# Patient Record
Sex: FEMALE | Race: WHITE | HISPANIC OR LATINO
[De-identification: names, ages, dates, MRNs, and addresses within clinical notes are randomized per-mention and may not be internally consistent; named-entity substitution may affect disease eponyms.]

---

## 2022-05-12 ENCOUNTER — APPOINTMENT (OUTPATIENT)
Dept: SURGERY | Facility: CLINIC | Age: 47
End: 2022-05-12
Payer: COMMERCIAL

## 2022-05-12 ENCOUNTER — NON-APPOINTMENT (OUTPATIENT)
Age: 47
End: 2022-05-12

## 2022-05-12 VITALS
TEMPERATURE: 97.5 F | WEIGHT: 148 LBS | OXYGEN SATURATION: 98 % | HEIGHT: 61 IN | SYSTOLIC BLOOD PRESSURE: 133 MMHG | HEART RATE: 78 BPM | DIASTOLIC BLOOD PRESSURE: 82 MMHG | BODY MASS INDEX: 27.94 KG/M2

## 2022-05-12 DIAGNOSIS — Z00.00 ENCOUNTER FOR GENERAL ADULT MEDICAL EXAMINATION W/OUT ABNORMAL FINDINGS: ICD-10-CM

## 2022-05-12 DIAGNOSIS — Z78.9 OTHER SPECIFIED HEALTH STATUS: ICD-10-CM

## 2022-05-12 DIAGNOSIS — Z72.0 TOBACCO USE: ICD-10-CM

## 2022-05-12 PROCEDURE — 99203 OFFICE O/P NEW LOW 30 MIN: CPT

## 2022-05-12 RX ORDER — OMEPRAZOLE 40 MG/1
40 CAPSULE, DELAYED RELEASE ORAL
Refills: 0 | Status: ACTIVE | COMMUNITY

## 2022-05-12 NOTE — ASSESSMENT
[FreeTextEntry1] : Ms Barboza understands that imaging demonstrates abnormal breast calcifications in upper inner left breast. We also discussed lobular carcinoma in situ and significance. She understands that lobular carcinoma in situ is a high risk marker which increases the risk of breast cancer in right and left breast.  We discussed option of surgical excisional biopsy v observation alone.  We also discussed technical aspects of localized excisional biopsy v CHRISTOPHER localized excisional biopsy.   We discussed risks v benefits v alternative to biopsy.    We also discussed high risk screening with mammogram/ultrasound v MRI as well as chemoprevention to decrease risk.  A copy of the pathology results were provided to her. All questions were answered.\par \par -MRI breast\par -CT C/A/P and bone scan\par - Plastics consultation\par - Radiation oncology consultation\par - Medical oncology consultation\par -  Follow up genetic testing\par -  Follow up in 2 weeks

## 2022-05-12 NOTE — HISTORY OF PRESENT ILLNESS
[FreeTextEntry1] : I had the pleasure of seeing Casandra Barboza in my office today for a new breast evaluation secondary to abnormal imaging. She under went core biopsy which has demonstrated lobular carcinoma in left breast.\par \par Imaging\par 3/1/22 mammo jim bilat- grouped calcifications upper inner left breast posteriorly,m possibly with associated nodule. Diagnostic left breast mammo recommended. \par 3/16/22 mammo jim unilat left- intermediate grouped calcifications upper inner left breast. biopsy recommended Bi-Rads 4 suspicious finding. \par 3/28/22 stereo breast biopsy with clip placement\par \par Pathology \par 4/8/22 A. Lobular carcinoma in situ. Fibrocystic change with epithelial calcifications. size 3.5x3x0.5cm\par B. left breast upper inner without calcification, core biopsy: fibrocystic change, size 1x1x0.3cm

## 2022-05-15 NOTE — PHYSICAL EXAM
[Normocephalic] : normocephalic [Atraumatic] : atraumatic [EOMI] : extra ocular movement intact [PERRL] : pupils equal, round and reactive to light [Sclera nonicteric] : sclera nonicteric [Supple] : supple [No Supraclavicular Adenopathy] : no supraclavicular adenopathy [Examined in the supine and seated position] : examined in the supine and seated position [No dominant masses] : no dominant masses in right breast  [No dominant masses] : no dominant masses left breast [No Nipple Retraction] : no left nipple retraction [No Nipple Discharge] : no left nipple discharge [No Axillary Lymphadenopathy] : no left axillary lymphadenopathy [No Edema] : no edema [No Rashes] : no rashes [No Ulceration] : no ulceration [Breast Nipple Inversion] : nipples not inverted [Breast Nipple Retraction] : nipples not retracted [Breast Nipple Flattening] : nipples not flattened [Breast Nipple Fissures] : nipples not fissured [Breast Abnormal Lactation (Galactorrhea)] : no galactorrhea [Breast Abnormal Secretion Bloody Fluid] : no bloody discharge [Breast Abnormal Secretion Serous Fluid] : no serous discharge [Breast Abnormal Secretion Opalescent Fluid] : no milky discharge [de-identified] : No supraclavicular or axillary adenopathy. No dominant breast mass, normal to palpation. Everted nipple without discharge. No skin changes. [de-identified] : No supraclavicular or axillary adenopathy. No dominant breast mass, normal to palpation. Everted nipple without discharge. No skin changes.

## 2022-05-15 NOTE — ASSESSMENT
[FreeTextEntry1] : 48 yo premenopausal female presents with abnormal left breast imaging and biopsy demonstrating LCIS. \par 1. CHRISTOPHER  localized excisional biopsy of the left breast\par 2. Follow up after biopsy to review pathology\par

## 2022-05-15 NOTE — HISTORY OF PRESENT ILLNESS
[FreeTextEntry1] :  re: newly diagnosed left breast LCIS\par \par I had the pleasure of seeing Casandra Barboza in my office today for a new breast evaluation secondary to abnormal imaging. Mongolian speaking patient who underwent core biopsy which has demonstrated lobular carcinoma in situ of left breast.\par \par She denies dominant breast mass, skin changes or nipple discharge. \par \par Imaging: Jason Nathaniri \par 3/1/22 bilateral mammo/jim- Impression- grouped calcifications upper inner left breast posteriorly,m possibly with associated nodule. Diagnostic left breast mammo recommended. Birads-0\par 3/16/22 mammo jim unilat left-, impression- intermediate grouped calcifications upper inner left breast. biopsy recommended Bi-Rads 4 suspicious finding. Birads-4\par 3/28/22 post procedure mammogram of left breast: stereo breast biopsy with clip placement\par \par Pathology \par 4/8/22 A. Lobular carcinoma in situ. Fibrocystic change with epithelial calcifications. size 3.5x3x0.5cm\par B. left breast upper inner without calcification, core biopsy: fibrocystic change, size 1x1x0.3cm \par \par We discussed lobular carcinoma in situ and significance. She understands that lobular carcinoma in situ is a high risk marker which increases the risk of breast cancer in right and left breast.  We discussed option of surgical excisional biopsy v observation alone.  We also discussed technical aspects of left breast CHRISTOPHER   localized excisional biopsy.   We discussed risks v benefits v alternative to biopsy.    We also discussed high risk screening with mammogram/ultrasound v MRI as well as chemoprevention to decrease risk.  A copy of the pathology results were provided to her. All questions were answered.\par \par

## 2022-06-15 ENCOUNTER — TRANSCRIPTION ENCOUNTER (OUTPATIENT)
Age: 47
End: 2022-06-15

## 2022-06-25 ENCOUNTER — APPOINTMENT (OUTPATIENT)
Dept: SURGERY | Facility: AMBULATORY MEDICAL SERVICES | Age: 47
End: 2022-06-25
Payer: COMMERCIAL

## 2022-06-25 ENCOUNTER — RESULT REVIEW (OUTPATIENT)
Age: 47
End: 2022-06-25

## 2022-06-25 PROCEDURE — 14001 TIS TRNFR TRUNK 10.1-30SQCM: CPT

## 2022-06-25 PROCEDURE — 19125 EXCISION BREAST LESION: CPT | Mod: LT

## 2022-07-12 ENCOUNTER — APPOINTMENT (OUTPATIENT)
Dept: SURGERY | Facility: CLINIC | Age: 47
End: 2022-07-12

## 2022-07-12 VITALS
BODY MASS INDEX: 29.07 KG/M2 | SYSTOLIC BLOOD PRESSURE: 135 MMHG | HEART RATE: 78 BPM | HEIGHT: 61 IN | WEIGHT: 154 LBS | OXYGEN SATURATION: 98 % | DIASTOLIC BLOOD PRESSURE: 80 MMHG | TEMPERATURE: 97.8 F

## 2022-07-12 DIAGNOSIS — D05.02 LOBULAR CARCINOMA IN SITU OF LEFT BREAST: ICD-10-CM

## 2022-07-12 DIAGNOSIS — Z12.39 ENCOUNTER FOR OTHER SCREENING FOR MALIGNANT NEOPLASM OF BREAST: ICD-10-CM

## 2022-07-12 PROCEDURE — 99024 POSTOP FOLLOW-UP VISIT: CPT

## 2022-08-13 NOTE — ASSESSMENT
[FreeTextEntry1] : 48 yo premenopausal female post operative left breast excisional biopsy with CHRISTOPHER  on 6/25/22. Patient is healing well. S/P LEFT excisional biopsy, usual ductal hyperplasia, fibrocystic changes including apocrine metaplasia, cysts and fibrosis, microcalcifications. Recommend medical oncology for chemo prevention discussion. Otherwise patient will be in high risk screening\par 1. Medical oncology Dr. Nguyen\par 2. Mammogram/ultrasound annually\par 3. MRI annually\par 4. Clinical breast examination in 6 months\par

## 2022-08-13 NOTE — PHYSICAL EXAM
[Breast Nipple Inversion] : nipples not inverted [Breast Nipple Retraction] : nipples not retracted [Breast Nipple Flattening] : nipples not flattened [Breast Nipple Fissures] : nipples not fissured [Breast Abnormal Lactation (Galactorrhea)] : no galactorrhea [Breast Abnormal Secretion Bloody Fluid] : no bloody discharge [Breast Abnormal Secretion Serous Fluid] : no serous discharge [Breast Abnormal Secretion Opalescent Fluid] : no milky discharge [de-identified] : No supraclavicular or axillary adenopathy. No dominant breast mass, normal to palpation. Everted nipple without discharge. No skin changes. [de-identified] : No supraclavicular or axillary adenopathy. No dominant breast mass, normal to palpation. Everted nipple without discharge. No skin changes.

## 2022-08-13 NOTE — HISTORY OF PRESENT ILLNESS
[FreeTextEntry1] :  re: newly diagnosed left breast LCIS\par \par I had the pleasure of seeing Casandra Barboza in my office today for a post operative visit. . Guyanese speaking patient who had core biopsy which has demonstrated lobular carcinoma in situ of left breast. Underwent left breast excisional biopsy with CHRISTOPHER  on 6/25/22. Patient is healing well.\par \par Imaging: Jason Roach \par 3/1/22 bilateral mammo/jim- Impression- grouped calcifications upper inner left breast posteriorly,m possibly with associated nodule. Diagnostic left breast mammo recommended. Birads-0\par 3/16/22 mammo jim unilat left-, impression- intermediate grouped calcifications upper inner left breast. biopsy recommended Bi-Rads 4 suspicious finding. Birads-4\par 3/28/22 post procedure mammogram of left breast: stereo breast biopsy with clip placement\par \par Surgical Pathology\par 6/25/22 Final diagnosis-left breast excisional biopsy, usual ductal hyperplasia, fibrocystic changes including apocrine metaplasia, cysts and fibrosis, microcalcifications. \par Pathology \par 4/8/22 A. Lobular carcinoma in situ. Fibrocystic change with epithelial calcifications. size 3.5x3x0.5cm\par B. left breast upper inner without calcification, core biopsy: fibrocystic change, size 1x1x0.3cm \par \par We have reviewed final pathology. All questions were answered.\par \par

## 2022-12-09 ENCOUNTER — APPOINTMENT (OUTPATIENT)
Dept: MRI IMAGING | Facility: CLINIC | Age: 47
End: 2022-12-09

## 2022-12-09 PROCEDURE — A9585: CPT

## 2022-12-09 PROCEDURE — 77049 MRI BREAST C-+ W/CAD BI: CPT

## 2023-07-19 ENCOUNTER — APPOINTMENT (OUTPATIENT)
Dept: MRI IMAGING | Facility: CLINIC | Age: 48
End: 2023-07-19

## 2023-07-19 ENCOUNTER — APPOINTMENT (OUTPATIENT)
Dept: MAMMOGRAPHY | Facility: CLINIC | Age: 48
End: 2023-07-19

## 2023-07-25 ENCOUNTER — APPOINTMENT (OUTPATIENT)
Dept: MAMMOGRAPHY | Facility: CLINIC | Age: 48
End: 2023-07-25
Payer: COMMERCIAL

## 2023-07-25 ENCOUNTER — APPOINTMENT (OUTPATIENT)
Dept: MRI IMAGING | Facility: CLINIC | Age: 48
End: 2023-07-25

## 2023-07-25 PROCEDURE — 77049 MRI BREAST C-+ W/CAD BI: CPT

## 2023-07-25 PROCEDURE — 77067 SCR MAMMO BI INCL CAD: CPT

## 2023-07-25 PROCEDURE — 77063 BREAST TOMOSYNTHESIS BI: CPT

## 2023-09-06 ENCOUNTER — OFFICE (OUTPATIENT)
Dept: URBAN - METROPOLITAN AREA CLINIC 1 | Facility: CLINIC | Age: 48
Setting detail: OPHTHALMOLOGY
End: 2023-09-06
Payer: COMMERCIAL

## 2023-09-06 DIAGNOSIS — H11.041: ICD-10-CM

## 2023-09-06 DIAGNOSIS — H31.421: ICD-10-CM

## 2023-09-06 DIAGNOSIS — H47.10: ICD-10-CM

## 2023-09-06 DIAGNOSIS — H11.152: ICD-10-CM

## 2023-09-06 DIAGNOSIS — H52.4: ICD-10-CM

## 2023-09-06 PROBLEM — H52.7 REFRACTIVE ERROR: Status: ACTIVE | Noted: 2023-09-06

## 2023-09-06 PROCEDURE — 92083 EXTENDED VISUAL FIELD XM: CPT

## 2023-09-06 PROCEDURE — 92004 COMPRE OPH EXAM NEW PT 1/>: CPT

## 2023-09-06 PROCEDURE — 92015 DETERMINE REFRACTIVE STATE: CPT

## 2023-09-06 PROCEDURE — 92250 FUNDUS PHOTOGRAPHY W/I&R: CPT

## 2023-09-06 ASSESSMENT — REFRACTION_CURRENTRX
OS_VPRISM_DIRECTION: PROGS
OS_CYLINDER: 0.00
OD_ADD: +2.00
OD_CYLINDER: -0.25
OD_AXIS: 051
OS_OVR_VA: 20/
OD_OVR_VA: 20/
OS_AXIS: 000
OS_ADD: +2.00
OD_SPHERE: +1.25
OD_VPRISM_DIRECTION: PROGS
OS_SPHERE: +1.50

## 2023-09-06 ASSESSMENT — REFRACTION_MANIFEST
OS_ADD: +2.50
OS_CYLINDER: SPH
OD_ADD: +2.50
OD_SPHERE: +1.50
OD_AXIS: 65
OS_VA1: 20/20
OD_VA1: 20/20
OD_CYLINDER: -0.50
OS_SPHERE: +1.75

## 2023-09-06 ASSESSMENT — KERATOMETRY
OS_K1POWER_DIOPTERS: 44.50
OD_AXISANGLE_DEGREES: 109
OS_K2POWER_DIOPTERS: 45.50
OD_K2POWER_DIOPTERS: 46.00
OD_K1POWER_DIOPTERS: 45.25
OS_AXISANGLE_DEGREES: 062

## 2023-09-06 ASSESSMENT — CONFRONTATIONAL VISUAL FIELD TEST (CVF)
OS_FINDINGS: FULL
OD_FINDINGS: FULL

## 2023-09-06 ASSESSMENT — REFRACTION_AUTOREFRACTION
OS_SPHERE: +2.50
OD_AXIS: 065
OS_AXIS: 109
OS_CYLINDER: -0.50
OD_CYLINDER: -0.50
OD_SPHERE: +2.00

## 2023-09-06 ASSESSMENT — AXIALLENGTH_DERIVED
OD_AL: 22.2149
OD_AL: 22.389
OS_AL: 22.2458

## 2023-09-06 ASSESSMENT — SPHEQUIV_DERIVED
OD_SPHEQUIV: 1.75
OD_SPHEQUIV: 1.25
OS_SPHEQUIV: 2.25

## 2023-09-06 ASSESSMENT — TONOMETRY
OD_IOP_MMHG: 12
OS_IOP_MMHG: 11

## 2023-09-06 ASSESSMENT — VISUAL ACUITY
OS_BCVA: 20/25
OD_BCVA: 20/20-2

## 2023-09-06 ASSESSMENT — CORNEAL PTERYGIUM: OD_PTERYGIUM: 1MM

## 2023-10-23 ENCOUNTER — OFFICE (OUTPATIENT)
Dept: URBAN - METROPOLITAN AREA CLINIC 105 | Facility: CLINIC | Age: 48
Setting detail: OPHTHALMOLOGY
End: 2023-10-23
Payer: COMMERCIAL

## 2023-10-23 DIAGNOSIS — H31.421: ICD-10-CM

## 2023-10-23 PROBLEM — H31.423 CHOROIDAL SEROUS DETACHMENT; BOTH EYES: Status: ACTIVE | Noted: 2023-10-23

## 2023-10-23 PROCEDURE — 92012 INTRM OPH EXAM EST PATIENT: CPT | Performed by: OPHTHALMOLOGY

## 2023-10-23 PROCEDURE — 92235 FLUORESCEIN ANGRPH MLTIFRAME: CPT | Performed by: OPHTHALMOLOGY

## 2023-10-23 PROCEDURE — 92134 CPTRZ OPH DX IMG PST SGM RTA: CPT | Performed by: OPHTHALMOLOGY

## 2023-10-23 ASSESSMENT — REFRACTION_CURRENTRX
OS_SPHERE: +1.50
OS_ADD: +2.00
OD_ADD: +2.00
OD_VPRISM_DIRECTION: PROGS
OS_AXIS: 000
OD_AXIS: 051
OD_CYLINDER: -0.25
OS_VPRISM_DIRECTION: PROGS
OS_CYLINDER: 0.00
OS_OVR_VA: 20/
OD_OVR_VA: 20/
OD_SPHERE: +1.25

## 2023-10-23 ASSESSMENT — KERATOMETRY
OD_AXISANGLE_DEGREES: 109
OS_K2POWER_DIOPTERS: 45.50
OS_AXISANGLE_DEGREES: 062
OD_K1POWER_DIOPTERS: 45.25
OD_K2POWER_DIOPTERS: 46.00
OS_K1POWER_DIOPTERS: 44.50

## 2023-10-23 ASSESSMENT — REFRACTION_MANIFEST
OD_AXIS: 65
OS_CYLINDER: SPH
OD_CYLINDER: -0.50
OD_VA1: 20/20
OS_SPHERE: +1.75
OD_ADD: +2.50
OD_SPHERE: +1.50
OS_ADD: +2.50
OS_VA1: 20/20

## 2023-10-23 ASSESSMENT — REFRACTION_AUTOREFRACTION
OD_SPHERE: +2.00
OS_CYLINDER: -0.50
OD_CYLINDER: -0.50
OD_AXIS: 065
OS_SPHERE: +2.50
OS_AXIS: 109

## 2023-10-23 ASSESSMENT — SPHEQUIV_DERIVED
OS_SPHEQUIV: 2.25
OD_SPHEQUIV: 1.75
OD_SPHEQUIV: 1.25

## 2023-10-23 ASSESSMENT — AXIALLENGTH_DERIVED
OS_AL: 22.2458
OD_AL: 22.2149
OD_AL: 22.389

## 2023-10-23 ASSESSMENT — VISUAL ACUITY
OD_BCVA: 20/30+
OS_BCVA: 20/20

## 2023-10-23 ASSESSMENT — TONOMETRY
OD_IOP_MMHG: 16
OS_IOP_MMHG: 14

## 2023-10-23 ASSESSMENT — CONFRONTATIONAL VISUAL FIELD TEST (CVF)
OD_FINDINGS: FULL
OS_FINDINGS: FULL

## 2023-10-23 ASSESSMENT — CORNEAL PTERYGIUM: OD_PTERYGIUM: 1MM

## 2024-12-04 ENCOUNTER — NON-APPOINTMENT (OUTPATIENT)
Age: 49
End: 2024-12-04

## 2024-12-04 ENCOUNTER — APPOINTMENT (OUTPATIENT)
Dept: OTOLARYNGOLOGY | Facility: CLINIC | Age: 49
End: 2024-12-04
Payer: COMMERCIAL

## 2024-12-04 VITALS — WEIGHT: 144 LBS | BODY MASS INDEX: 27.19 KG/M2 | HEIGHT: 61 IN

## 2024-12-04 VITALS — SYSTOLIC BLOOD PRESSURE: 157 MMHG | HEART RATE: 80 BPM | DIASTOLIC BLOOD PRESSURE: 87 MMHG

## 2024-12-04 DIAGNOSIS — Z87.898 PERSONAL HISTORY OF OTHER SPECIFIED CONDITIONS: ICD-10-CM

## 2024-12-04 DIAGNOSIS — H81.13 BENIGN PAROXYSMAL VERTIGO, BILATERAL: ICD-10-CM

## 2024-12-04 DIAGNOSIS — R73.03 PREDIABETES.: ICD-10-CM

## 2024-12-04 DIAGNOSIS — H90.3 SENSORINEURAL HEARING LOSS, BILATERAL: ICD-10-CM

## 2024-12-04 DIAGNOSIS — Z87.09 PERSONAL HISTORY OF OTHER DISEASES OF THE RESPIRATORY SYSTEM: ICD-10-CM

## 2024-12-04 DIAGNOSIS — H91.21 SUDDEN IDIOPATHIC HEARING LOSS, RIGHT EAR: ICD-10-CM

## 2024-12-04 DIAGNOSIS — Z83.3 FAMILY HISTORY OF DIABETES MELLITUS: ICD-10-CM

## 2024-12-04 DIAGNOSIS — H69.93 UNSPECIFIED EUSTACHIAN TUBE DISORDER, BILATERAL: ICD-10-CM

## 2024-12-04 PROCEDURE — 92567 TYMPANOMETRY: CPT

## 2024-12-04 PROCEDURE — 92557 COMPREHENSIVE HEARING TEST: CPT

## 2024-12-04 PROCEDURE — 99204 OFFICE O/P NEW MOD 45 MIN: CPT

## 2024-12-04 RX ORDER — ALENDRONATE SODIUM 70 MG/1
TABLET ORAL
Refills: 0 | Status: ACTIVE | COMMUNITY

## 2024-12-04 RX ORDER — ANASTROZOLE TABLETS 1 MG/1
TABLET ORAL
Refills: 0 | Status: ACTIVE | COMMUNITY

## 2024-12-04 RX ORDER — METFORMIN HYDROCHLORIDE 625 MG/1
TABLET ORAL
Refills: 0 | Status: ACTIVE | COMMUNITY

## 2024-12-04 RX ORDER — PREDNISONE 20 MG/1
20 TABLET ORAL
Qty: 15 | Refills: 2 | Status: ACTIVE | COMMUNITY
Start: 2024-12-04 | End: 1900-01-01

## 2024-12-04 RX ORDER — ATORVASTATIN CALCIUM 80 MG/1
TABLET, FILM COATED ORAL
Refills: 0 | Status: ACTIVE | COMMUNITY

## 2024-12-24 ENCOUNTER — APPOINTMENT (OUTPATIENT)
Dept: OTOLARYNGOLOGY | Facility: CLINIC | Age: 49
End: 2024-12-24
Payer: COMMERCIAL

## 2024-12-24 VITALS — HEIGHT: 61 IN | WEIGHT: 146 LBS | BODY MASS INDEX: 27.56 KG/M2

## 2024-12-24 DIAGNOSIS — H91.21 SUDDEN IDIOPATHIC HEARING LOSS, RIGHT EAR: ICD-10-CM

## 2024-12-24 DIAGNOSIS — H69.93 UNSPECIFIED EUSTACHIAN TUBE DISORDER, BILATERAL: ICD-10-CM

## 2024-12-24 DIAGNOSIS — H93.11 TINNITUS, RIGHT EAR: ICD-10-CM

## 2024-12-24 PROCEDURE — 99213 OFFICE O/P EST LOW 20 MIN: CPT

## 2024-12-24 PROCEDURE — 92567 TYMPANOMETRY: CPT

## 2024-12-24 PROCEDURE — 92553 AUDIOMETRY AIR & BONE: CPT

## 2025-03-17 ENCOUNTER — APPOINTMENT (OUTPATIENT)
Dept: OTOLARYNGOLOGY | Facility: CLINIC | Age: 50
End: 2025-03-17
Payer: COMMERCIAL

## 2025-03-17 ENCOUNTER — NON-APPOINTMENT (OUTPATIENT)
Age: 50
End: 2025-03-17

## 2025-03-17 VITALS — WEIGHT: 145 LBS | HEIGHT: 61 IN | BODY MASS INDEX: 27.38 KG/M2

## 2025-03-17 DIAGNOSIS — H93.11 TINNITUS, RIGHT EAR: ICD-10-CM

## 2025-03-17 DIAGNOSIS — H69.93 UNSPECIFIED EUSTACHIAN TUBE DISORDER, BILATERAL: ICD-10-CM

## 2025-03-17 DIAGNOSIS — H91.21 SUDDEN IDIOPATHIC HEARING LOSS, RIGHT EAR: ICD-10-CM

## 2025-03-17 PROCEDURE — 99213 OFFICE O/P EST LOW 20 MIN: CPT

## 2025-03-17 PROCEDURE — 92567 TYMPANOMETRY: CPT

## 2025-03-17 PROCEDURE — 92553 AUDIOMETRY AIR & BONE: CPT
